# Patient Record
Sex: FEMALE | Race: BLACK OR AFRICAN AMERICAN | Employment: UNEMPLOYED | URBAN - METROPOLITAN AREA
[De-identification: names, ages, dates, MRNs, and addresses within clinical notes are randomized per-mention and may not be internally consistent; named-entity substitution may affect disease eponyms.]

---

## 2017-04-11 NOTE — PATIENT DISCUSSION
(H25.12) Age-related nuclear cataract, left eye - Assesment : Examination revealed cataract. - Plan : Monitor for changes. Advised patient to call our office with decreased vision or increased symptoms.

## 2017-04-11 NOTE — PATIENT DISCUSSION
(H25.013) Cortical age-related cataract, bilateral - Assesment : Examination revealed Cortical Senile Cataract. - Plan : Monitor for changes. Advised patient to call our office with decreased vision or increased symptoms.  Final Rx for glasses given to patient 1 year Exam

## 2018-04-12 NOTE — PATIENT DISCUSSION
(G22.647) Keratoconjunct sicca, not specified as Sjogren's, bilateral - Assesment : Examination revealed Dry Eye Syndrome OU. - Plan : Monitor for changes. Advised patient to call our office with decreased vision or increased symptoms.

## 2018-04-12 NOTE — PATIENT DISCUSSION
(H25.013) Cortical age-related cataract, bilateral - Assesment : Examination revealed Cortical Mild Cataract OU. Patient is currently asymptomatic and functioning well. - Plan : Monitor for changes. Advised patient to call our office with decreased vision or increased symptoms.   RTC 1 year/EXAM

## 2018-12-06 NOTE — PATIENT DISCUSSION
(H43.811) Vitreous degeneration, right eye - Assesment : Examination revealed Dense PVD off disc OD. No defects 360 degrees w/ 90 D wide angle lens OU. - Plan : Monitor for changes. Advised patient to call our office with decreased vision or an increase in flashes and/or floaters. Keep scheduled appt.

## 2019-04-19 NOTE — PATIENT DISCUSSION
(W30.663) Vitreous degeneration, bilateral - Assesment : Examination revealed a posterior vitreous detachment. - Plan : Monitor for changes. Advised patient to call our office with decreased vision or an increase in flashes and/or floaters.

## 2019-04-19 NOTE — PATIENT DISCUSSION
(H25.013) Cortical age-related cataract, bilateral - Assesment : Examination revealed Cortical Senile Cataract. Mild OU. Patient is currently asymptomatic and functioning well. - Plan : Monitor for changes. Advised patient to call our office with decreased vision or increased symptoms.   RTC 1 year/EXAM

## 2019-04-19 NOTE — PATIENT DISCUSSION
(S62.032) Keratoconjunct sicca, not specified as Sjogren's, bilateral - Assesment : Examination revealed Dry Eye Syndrome OU. - Plan : Monitor for changes. Advised patient to call our office with decreased vision or increased symptoms.

## 2019-09-20 ENCOUNTER — NEW PATIENT (OUTPATIENT)
Dept: URBAN - METROPOLITAN AREA CLINIC 46 | Facility: CLINIC | Age: 49
End: 2019-09-20

## 2019-09-20 DIAGNOSIS — H40.023: ICD-10-CM

## 2019-09-20 DIAGNOSIS — H25.813: ICD-10-CM

## 2019-09-20 PROCEDURE — 99204 OFFICE O/P NEW MOD 45 MIN: CPT

## 2019-09-20 PROCEDURE — 9222550 BILAT EXTENDED OPHTHALMOSCOPY, FIRST

## 2019-09-20 PROCEDURE — 76514 ECHO EXAM OF EYE THICKNESS: CPT

## 2019-09-20 PROCEDURE — 92020 GONIOSCOPY: CPT

## 2019-09-20 PROCEDURE — 92250 FUNDUS PHOTOGRAPHY W/I&R: CPT

## 2019-09-20 ASSESSMENT — VISUAL ACUITY
OD_SC: J6
OS_SC: J6
OD_SC: 20/30-2
OS_SC: 20/20

## 2019-09-20 ASSESSMENT — PACHYMETRY
OS_CT_UM: 540
OD_CT_UM: 534

## 2019-09-20 ASSESSMENT — TONOMETRY
OD_IOP_MMHG: 12
OS_IOP_MMHG: 10

## 2020-03-20 ENCOUNTER — IOP CHECK (OUTPATIENT)
Dept: URBAN - METROPOLITAN AREA CLINIC 46 | Facility: CLINIC | Age: 50
End: 2020-03-20

## 2020-03-20 DIAGNOSIS — H40.023: ICD-10-CM

## 2020-03-20 DIAGNOSIS — H25.813: ICD-10-CM

## 2020-03-20 PROCEDURE — 92083 EXTENDED VISUAL FIELD XM: CPT

## 2020-03-20 PROCEDURE — 92012 INTRM OPH EXAM EST PATIENT: CPT

## 2020-03-20 ASSESSMENT — VISUAL ACUITY
OS_CC: 20/25
OD_CC: 20/25

## 2020-03-20 ASSESSMENT — TONOMETRY
OS_IOP_MMHG: 12
OD_IOP_MMHG: 13

## 2020-08-07 ENCOUNTER — PREPPED CHART (OUTPATIENT)
Dept: URBAN - METROPOLITAN AREA CLINIC 46 | Facility: CLINIC | Age: 50
End: 2020-08-07

## 2021-04-13 NOTE — PATIENT DISCUSSION
Advised patient that she would benefit from wearing corrective glasses. Patient refuses to wear corrective glasses. Advised patient she barely meets the qualification for [facility].

## 2021-04-13 NOTE — PATIENT DISCUSSION
Monitor for changes. Advised patient to call our office with decreased vision or increased symptoms.  RTC 1 year/EXAM.